# Patient Record
Sex: FEMALE | Race: WHITE | NOT HISPANIC OR LATINO | Employment: FULL TIME | URBAN - METROPOLITAN AREA
[De-identification: names, ages, dates, MRNs, and addresses within clinical notes are randomized per-mention and may not be internally consistent; named-entity substitution may affect disease eponyms.]

---

## 2017-08-02 ENCOUNTER — OFFICE VISIT - RIVER FALLS (OUTPATIENT)
Dept: FAMILY MEDICINE | Facility: CLINIC | Age: 52
End: 2017-08-02

## 2017-08-02 ASSESSMENT — MIFFLIN-ST. JEOR: SCORE: 1313.8

## 2017-08-08 ENCOUNTER — TRANSFERRED RECORDS (OUTPATIENT)
Dept: HEALTH INFORMATION MANAGEMENT | Facility: CLINIC | Age: 52
End: 2017-08-08

## 2017-08-08 LAB — HPV ABSTRACT: NORMAL

## 2021-01-26 ENCOUNTER — AMBULATORY - HEALTHEAST (OUTPATIENT)
Dept: NURSING | Facility: CLINIC | Age: 56
End: 2021-01-26

## 2021-02-16 ENCOUNTER — AMBULATORY - HEALTHEAST (OUTPATIENT)
Dept: NURSING | Facility: CLINIC | Age: 56
End: 2021-02-16

## 2022-01-08 ENCOUNTER — APPOINTMENT (OUTPATIENT)
Dept: RADIOLOGY | Facility: CLINIC | Age: 57
End: 2022-01-08
Attending: EMERGENCY MEDICINE
Payer: COMMERCIAL

## 2022-01-08 ENCOUNTER — HOSPITAL ENCOUNTER (EMERGENCY)
Facility: CLINIC | Age: 57
Discharge: HOME | End: 2022-01-08
Attending: EMERGENCY MEDICINE | Admitting: EMERGENCY MEDICINE
Payer: COMMERCIAL

## 2022-01-08 VITALS
RESPIRATION RATE: 18 BRPM | DIASTOLIC BLOOD PRESSURE: 72 MMHG | TEMPERATURE: 98.3 F | WEIGHT: 180 LBS | SYSTOLIC BLOOD PRESSURE: 146 MMHG | OXYGEN SATURATION: 96 % | HEART RATE: 72 BPM

## 2022-01-08 DIAGNOSIS — R07.89 ATYPICAL CHEST PAIN: ICD-10-CM

## 2022-01-08 LAB
ANION GAP SERPL CALCULATED.3IONS-SCNC: 7 MMOL/L (ref 5–18)
ATRIAL RATE - MUSE: 84 BPM
BASOPHILS # BLD AUTO: 0.1 10E3/UL (ref 0–0.2)
BASOPHILS NFR BLD AUTO: 1 %
BUN SERPL-MCNC: 14 MG/DL (ref 8–22)
CALCIUM SERPL-MCNC: 9.5 MG/DL (ref 8.5–10.5)
CHLORIDE BLD-SCNC: 106 MMOL/L (ref 98–107)
CO2 SERPL-SCNC: 27 MMOL/L (ref 22–31)
CREAT SERPL-MCNC: 0.7 MG/DL (ref 0.6–1.1)
D DIMER PPP FEU-MCNC: 0.38 UG/ML FEU (ref 0–0.5)
DIASTOLIC BLOOD PRESSURE - MUSE: NORMAL MMHG
EOSINOPHIL # BLD AUTO: 0.1 10E3/UL (ref 0–0.7)
EOSINOPHIL NFR BLD AUTO: 2 %
ERYTHROCYTE [DISTWIDTH] IN BLOOD BY AUTOMATED COUNT: 12.2 % (ref 10–15)
FLUAV RNA SPEC QL NAA+PROBE: NEGATIVE
FLUBV RNA RESP QL NAA+PROBE: NEGATIVE
GFR SERPL CREATININE-BSD FRML MDRD: >90 ML/MIN/1.73M2
GLUCOSE BLD-MCNC: 90 MG/DL (ref 70–125)
HCT VFR BLD AUTO: 43.6 % (ref 35–47)
HGB BLD-MCNC: 14.9 G/DL (ref 11.7–15.7)
IMM GRANULOCYTES # BLD: 0 10E3/UL
IMM GRANULOCYTES NFR BLD: 0 %
INTERPRETATION ECG - MUSE: NORMAL
LYMPHOCYTES # BLD AUTO: 2.1 10E3/UL (ref 0.8–5.3)
LYMPHOCYTES NFR BLD AUTO: 31 %
MCH RBC QN AUTO: 29.3 PG (ref 26.5–33)
MCHC RBC AUTO-ENTMCNC: 34.2 G/DL (ref 31.5–36.5)
MCV RBC AUTO: 86 FL (ref 78–100)
MONOCYTES # BLD AUTO: 0.5 10E3/UL (ref 0–1.3)
MONOCYTES NFR BLD AUTO: 8 %
NEUTROPHILS # BLD AUTO: 3.9 10E3/UL (ref 1.6–8.3)
NEUTROPHILS NFR BLD AUTO: 58 %
NRBC # BLD AUTO: 0 10E3/UL
NRBC BLD AUTO-RTO: 0 /100
P AXIS - MUSE: 68 DEGREES
PLATELET # BLD AUTO: 299 10E3/UL (ref 150–450)
POTASSIUM BLD-SCNC: 4.1 MMOL/L (ref 3.5–5)
PR INTERVAL - MUSE: 126 MS
QRS DURATION - MUSE: 88 MS
QT - MUSE: 386 MS
QTC - MUSE: 456 MS
R AXIS - MUSE: 83 DEGREES
RBC # BLD AUTO: 5.09 10E6/UL (ref 3.8–5.2)
SARS-COV-2 RNA RESP QL NAA+PROBE: NEGATIVE
SODIUM SERPL-SCNC: 140 MMOL/L (ref 136–145)
SYSTOLIC BLOOD PRESSURE - MUSE: NORMAL MMHG
T AXIS - MUSE: 46 DEGREES
TROPONIN I SERPL-MCNC: <0.01 NG/ML (ref 0–0.29)
VENTRICULAR RATE- MUSE: 84 BPM
WBC # BLD AUTO: 6.6 10E3/UL (ref 4–11)

## 2022-01-08 PROCEDURE — 84484 ASSAY OF TROPONIN QUANT: CPT | Performed by: EMERGENCY MEDICINE

## 2022-01-08 PROCEDURE — 80048 BASIC METABOLIC PNL TOTAL CA: CPT | Performed by: EMERGENCY MEDICINE

## 2022-01-08 PROCEDURE — 85025 COMPLETE CBC W/AUTO DIFF WBC: CPT | Performed by: EMERGENCY MEDICINE

## 2022-01-08 PROCEDURE — 36415 COLL VENOUS BLD VENIPUNCTURE: CPT | Performed by: EMERGENCY MEDICINE

## 2022-01-08 PROCEDURE — 250N000013 HC RX MED GY IP 250 OP 250 PS 637: Performed by: EMERGENCY MEDICINE

## 2022-01-08 PROCEDURE — C9803 HOPD COVID-19 SPEC COLLECT: HCPCS

## 2022-01-08 PROCEDURE — 71046 X-RAY EXAM CHEST 2 VIEWS: CPT

## 2022-01-08 PROCEDURE — 99285 EMERGENCY DEPT VISIT HI MDM: CPT | Mod: 25

## 2022-01-08 PROCEDURE — 93005 ELECTROCARDIOGRAM TRACING: CPT | Performed by: EMERGENCY MEDICINE

## 2022-01-08 PROCEDURE — 85379 FIBRIN DEGRADATION QUANT: CPT | Performed by: EMERGENCY MEDICINE

## 2022-01-08 PROCEDURE — 87636 SARSCOV2 & INF A&B AMP PRB: CPT | Performed by: EMERGENCY MEDICINE

## 2022-01-08 RX ORDER — ASPIRIN 81 MG/1
81 TABLET, CHEWABLE ORAL ONCE
Status: COMPLETED | OUTPATIENT
Start: 2022-01-08 | End: 2022-01-08

## 2022-01-08 RX ADMIN — ASPIRIN 81 MG CHEWABLE TABLET 81 MG: 81 TABLET CHEWABLE at 11:20

## 2022-01-08 NOTE — ED PROVIDER NOTES
EMERGENCY DEPARTMENT ENCOUNTER      NAME: Nisa Daniel  AGE: 56 year old female  YOB: 1965  MRN: [id number]  EVALUATION DATE & TIME: No admission date for patient encounter.    PCP: No primary care provider on file.    ED PROVIDER: Yannick Patterson M.D.      Chief Complaint   Patient presents with     Chest Pain         FINAL IMPRESSION:  1. Atypical chest pain          ED COURSE & MEDICAL DECISION MAKING:    Pertinent Labs & Imaging studies reviewed. (See chart for details)  56 year old female presents to the Emergency Department for evaluation of chest pain. Patient appears non toxic with stable vitals signs, patient is afebrile with no tachycardia or hypoxia, no increased work of breathing. Overall exam is benign.  Lungs are clear and abdomen is benign.  Patient denies any fevers or cough, pleurisy, hemoptysis, falls or trauma.  Considered ACS, considered PE though her story is atypical, nothing to suggest dissection, esophageal rupture, lungs are clear and again with no fever or cough to really suggest Covid though she does endorse some shortness of breath, nothing to suggest CHF, COPD, asthma or other restrictive lung process.  We will obtain screening labs, troponin, D-dimer, ECG and imaging studies based on the D-dimer result.  Patient was given aspirin, offered but she deferred other pain medications.    Reassessment: D-dimer was negative and so we did obtain chest x-ray.  Troponin negative and ECG nonischemic, given duration of symptoms, otherwise low risk factors and benign exam certainly nothing to suggest cardiac ischemia.  Chest x-ray reported no acute concerning findings and Covid and influenza screen was negative.  Repeat exam is benign the patient continued appear quite well and comfortable.  With negative work-up, benign exam and well appearance feel she is safe for discharge and close follow-up.  Will recommend conservative management with Tylenol or ibuprofen.  Given atypical  "story and negative work-up feel that she can first follow-up with primary care and I will recommend that she first follows up with her primary care provider or our [facility] in the next 5 to 7 days for continued outpatient management evaluation.  Discussed all these findings recommendations with the patient felt reassured and comfortable discharge.  Return precautions provided.    At the conclusion of the encounter I discussed the results of all of the tests and the disposition. The questions were answered and return precautions provided. The patient or family acknowledged understanding and was agreeable with the care plan.     10:32 AM I met with the patient, obtained history, performed an initial exam, and discussed options and plan for diagnostics and treatment here in the ED.  10:38 AM Reviewed ECG.   12:33 PM repeat exam is benign.  Discussed findings and discharge close follow-up.      MEDICATIONS GIVEN IN THE EMERGENCY:  Medications   aspirin (ASA) chewable tablet 81 mg (81 mg Oral Given 1/8/22 1120)       NEW PRESCRIPTIONS STARTED AT TODAY'S ER VISIT  There are no discharge medications for this patient.           =================================================================    HPI    Patient information was obtained from: Patient    Use of Intrepreter: N/A         Nisa Daniel is a 56 year old female with an unremarkable medical history who presents via private auto for evaluation of chest pain.    Patient reports sudden onset of substernal chest pain at 0430 this morning. Patient describes the pain as a squeezing/pressure that is constant. She denies any clear alleviating or aggravating factors. She notes associated symptoms of mild shortness of breath and nausea without vomit. Her shortness of breath is \"not significant\" and she did take omeprazole today with no relief of symptoms. She reports that she has been having intermittent left subclavicular pain for the past 2 weeks " (since ~12/25/21). She denies any recent increase in activity, recent surgeries, or immobilizations, that could have provoked symptoms.     Patient does reports an extensive family history of cardiac and neuro problems. On her maternal side there is a history of stroke and on her paternal side there is a cardiac history. Patient expresses concern with today's symptoms given this history. Patient herself denies any heart issues, hypertension, high cholesterol, diabetes, or any other pertinent medical problems. She denies any allergies to medications and is vaccinated against COVID x3. Patient denies fever, cough, urinary symptoms, changes in bowel movements, rashes, chills, hemoptysis, or any other complaints at this time.     Shx: Patient denies tobacco, drug, and alcohol use.     REVIEW OF SYSTEMS   Constitutional:  Denies fever, chills  Respiratory:  Denies productive cough or increased work of breathing  Cardiovascular:  Denies chest pain, palpitations  GI:  Denies abdominal pain, nausea, vomiting, or change in bowel or bladder habits   Musculoskeletal:  Denies any new muscle/joint swelling  Skin:  Denies rash   Neurologic:  Denies focal weakness  All systems negative except as marked.     PAST MEDICAL HISTORY:  History reviewed. No pertinent past medical history.    PAST SURGICAL HISTORY:  History reviewed. No pertinent surgical history.      CURRENT MEDICATIONS:    Prior to Admission medications    Not on File        ALLERGIES:  No Known Allergies    FAMILY HISTORY:  History reviewed. No pertinent family history.    SOCIAL HISTORY:   Social History     Socioeconomic History     Marital status: Not on file     Spouse name: Not on file     Number of children: Not on file     Years of education: Not on file     Highest education level: Not on file   Occupational History     Not on file   Tobacco Use     Smoking status: Not on file     Smokeless tobacco: No   Substance and Sexual Activity     Alcohol use: No      Drug use: No     Sexual activity: Not on file   Other Topics Concern     Not on file   Social History Narrative     Not on file     Social Determinants of Health     Financial Resource Strain: Not on file   Food Insecurity: Not on file   Transportation Needs: Not on file   Physical Activity: Not on file   Stress: Not on file   Social Connections: Not on file   Intimate Partner Violence: Not on file   Housing Stability: Not on file       VITALS:  Patient Vitals for the past 24 hrs:   BP Temp Temp src Pulse Resp SpO2 Weight   01/08/22 1300 (!) 146/72 -- -- 72 18 96 % --   01/08/22 1245 125/72 -- -- 72 26 96 % --   01/08/22 1230 115/70 -- -- 73 18 98 % --   01/08/22 1215 134/85 -- -- 69 23 97 % --   01/08/22 1200 124/80 -- -- 74 21 96 % --   01/08/22 1145 138/76 -- -- 69 23 96 % --   01/08/22 1130 (!) 156/83 -- -- 80 27 95 % --   01/08/22 1115 (!) 162/77 -- -- 77 23 96 % --   01/08/22 1100 (!) 140/84 -- -- 82 27 97 % --   01/08/22 1028 (!) 167/103 98.3  F (36.8  C) Oral 89 16 97 % 81.6 kg (180 lb)        PHYSICAL EXAM    Constitutional:  Awake, alert, in no apparent distress  HENT:  Normocephalic, Atraumatic. Bilateral external ears normal. Oropharynx moist. Nose normal. Neck- Normal range of motion with no guarding, No midline cervical tenderness, Supple, No stridor.   Eyes:  PERRL, EOMI with no signs of entrapment, Conjunctiva normal, No discharge.   Respiratory:  Normal breath sounds, No respiratory distress, No wheezing.    Cardiovascular:  Normal heart rate, Normal rhythm, No appreciable rubs or gallops.   GI:  Soft, No tenderness, No distension, No palpable masses  Musculoskeletal:  Intact distal pulses, No edema. Good range of motion in all major joints. No tenderness to palpation or major deformities noted.  Integument:  Warm, Dry, No erythema, No rash.   Neurologic:  Alert & oriented, Normal motor function, Normal sensory function, No focal deficits noted.   Psychiatric:  Affect normal, Judgment normal, Mood  normal.     LAB:  All pertinent labs reviewed and interpreted.  Results for orders placed or performed during the hospital encounter of 01/08/22   XR Chest 2 Views    Impression    IMPRESSION: No pleural fluid or pneumothorax. No airspace disease or edema. Normal size of the heart.   D dimer quantitative   Result Value Ref Range    D-Dimer Quantitative 0.38 0.00 - 0.50 ug/mL FEU   Basic metabolic panel   Result Value Ref Range    Sodium 140 136 - 145 mmol/L    Potassium 4.1 3.5 - 5.0 mmol/L    Chloride 106 98 - 107 mmol/L    Carbon Dioxide (CO2) 27 22 - 31 mmol/L    Anion Gap 7 5 - 18 mmol/L    Urea Nitrogen 14 8 - 22 mg/dL    Creatinine 0.70 0.60 - 1.10 mg/dL    Calcium 9.5 8.5 - 10.5 mg/dL    Glucose 90 70 - 125 mg/dL    GFR Estimate >90 >60 mL/min/1.73m2   Result Value Ref Range    Troponin I <0.01 0.00 - 0.29 ng/mL   Symptomatic; Unknown Influenza A/B & SARS-CoV2 (COVID-19) Virus PCR Multiplex Nasopharyngeal    Specimen: Nasopharyngeal; Swab   Result Value Ref Range    Influenza A PCR Negative Negative    Influenza B PCR Negative Negative    SARS CoV2 PCR Negative Negative   CBC with platelets and differential   Result Value Ref Range    WBC Count 6.6 4.0 - 11.0 10e3/uL    RBC Count 5.09 3.80 - 5.20 10e6/uL    Hemoglobin 14.9 11.7 - 15.7 g/dL    Hematocrit 43.6 35.0 - 47.0 %    MCV 86 78 - 100 fL    MCH 29.3 26.5 - 33.0 pg    MCHC 34.2 31.5 - 36.5 g/dL    RDW 12.2 10.0 - 15.0 %    Platelet Count 299 150 - 450 10e3/uL    % Neutrophils 58 %    % Lymphocytes 31 %    % Monocytes 8 %    % Eosinophils 2 %    % Basophils 1 %    % Immature Granulocytes 0 %    NRBCs per 100 WBC 0 <1 /100    Absolute Neutrophils 3.9 1.6 - 8.3 10e3/uL    Absolute Lymphocytes 2.1 0.8 - 5.3 10e3/uL    Absolute Monocytes 0.5 0.0 - 1.3 10e3/uL    Absolute Eosinophils 0.1 0.0 - 0.7 10e3/uL    Absolute Basophils 0.1 0.0 - 0.2 10e3/uL    Absolute Immature Granulocytes 0.0 <=0.4 10e3/uL    Absolute NRBCs 0.0 10e3/uL       RADIOLOGY:  XR Chest 2  Views   Final Result   IMPRESSION: No pleural fluid or pneumothorax. No airspace disease or edema. Normal size of the heart.             EKG:    Sinus rhythm with sinus arrhythmia, no specific ST acute ischemic changes, no concerning dysrhythmias or interval prolongation, no priors for comparison    I have independently reviewed and interpreted the EKG(s) documented above.    PROCEDURES:   None.       I, Vidhya Martini, am serving as a scribe to document services personally performed by Yannick Patterson MD, based on my observation and the provider's statements to me. I, Yannick Patterson MD attest that Vidhya Martini is acting in a scribe capacity, has observed my performance of the services and has documented them in accordance with my direction.    Yannick Patterson M.D.  Emergency Medicine  [facility] EMERGENCY ROOM  [address] [facility] [address] [id number] [phone]  Dept: [phone]       Yannick Patterson MD  01/08/22 4722

## 2022-01-08 NOTE — ED TRIAGE NOTES
Patient is here with constant mid chest pain that at 0430 this morning. Nothing makes this better or worse. She is complaining of slight nausea with shortness of breath. She did have intermittent sub clavicle pain prior for two weeks. 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 Strong cardiac and stroke hx in her family.

## 2022-01-08 NOTE — DISCHARGE INSTRUCTIONS
You may take Tylenol or ibuprofen as needed for pain and discomfort, fevers.  Follow-up with your primary care provider or our primary care referral in the next 5 to 7 days for continued outpatient management evaluation.  Please return for any worsening or more concerning symptoms.

## 2022-01-11 ENCOUNTER — OFFICE VISIT (OUTPATIENT)
Dept: FAMILY MEDICINE | Facility: CLINIC | Age: 57
End: 2022-01-11
Payer: COMMERCIAL

## 2022-01-11 VITALS
BODY MASS INDEX: 29.82 KG/M2 | HEART RATE: 81 BPM | OXYGEN SATURATION: 98 % | HEIGHT: 65 IN | WEIGHT: 179 LBS | DIASTOLIC BLOOD PRESSURE: 78 MMHG | SYSTOLIC BLOOD PRESSURE: 132 MMHG

## 2022-01-11 DIAGNOSIS — Z12.11 SPECIAL SCREENING FOR MALIGNANT NEOPLASMS, COLON: Primary | ICD-10-CM

## 2022-01-11 DIAGNOSIS — D68.2 FACTOR V DEFICIENCY (H): ICD-10-CM

## 2022-01-11 DIAGNOSIS — E66.3 OVERWEIGHT WITH BODY MASS INDEX (BMI) OF 29 TO 29.9 IN ADULT: ICD-10-CM

## 2022-01-11 DIAGNOSIS — R07.89 ATYPICAL CHEST PAIN: ICD-10-CM

## 2022-01-11 DIAGNOSIS — Z00.00 ENCOUNTER FOR PREVENTIVE CARE: ICD-10-CM

## 2022-01-11 DIAGNOSIS — Z13.820 SCREENING FOR OSTEOPOROSIS: ICD-10-CM

## 2022-01-11 DIAGNOSIS — Z12.31 ENCOUNTER FOR SCREENING MAMMOGRAM FOR BREAST CANCER: ICD-10-CM

## 2022-01-11 DIAGNOSIS — Z82.49 FAMILY HISTORY OF CORONARY ARTERIOSCLEROSIS: ICD-10-CM

## 2022-01-11 PROCEDURE — 99204 OFFICE O/P NEW MOD 45 MIN: CPT | Performed by: FAMILY MEDICINE

## 2022-01-11 ASSESSMENT — MIFFLIN-ST. JEOR: SCORE: 1407.82

## 2022-01-11 NOTE — ASSESSMENT & PLAN NOTE
Covid vaccine - done and boosted.   Flu shot done.   Pap: need pap anytime now.   Mammo: ordered.   Colonoscopy: normal 10 years ago and due for follow up.    Std testing desired:  offered  Osteoporosis prevention discussed.  vitamin d levels ordered. Recommend daily calcium and vitamin d intake to keep good bone health. Recommend weight bearing exercise, no tobacco, and limit alcohol  dexa - normal.   Recommend sunscreen, exercise, & healthy diet.  Offered cbc, cmp, lipids and asked what other testing she  desires today  I have had an Advance Directives discussion with the patient. She says she has this, and she will bring it.   Body mass index is 29.79 kg/m .   mychart active.

## 2022-01-11 NOTE — ASSESSMENT & PLAN NOTE
She has done a coronary calcium score 7-8 years ago and it was basically normal.   At this time we will undertake lipid panel, and repeat coronary calcium score and cardiology consult.

## 2022-01-11 NOTE — PROGRESS NOTES
Assessment & Plan   Problem List Items Addressed This Visit        Hematologic    Factor V deficiency (H)    Relevant Orders    Adult Cardiology Eval Referral    CT Coronary Calcium Scan       Other    Family history of coronary arteriosclerosis     She has done a coronary calcium score 7-8 years ago and it was basically normal.   At this time we will undertake lipid panel, and repeat coronary calcium score and cardiology consult.          Relevant Orders    Adult Cardiology Eval Referral    CT Coronary Calcium Scan    Encounter for preventive care     Covid vaccine - done and boosted.   Flu shot done.   Pap: need pap anytime now.   Mammo: ordered.   Colonoscopy: normal 10 years ago and due for follow up.    Std testing desired:  offered  Osteoporosis prevention discussed.  vitamin d levels ordered. Recommend daily calcium and vitamin d intake to keep good bone health. Recommend weight bearing exercise, no tobacco, and limit alcohol  dexa - normal.   Recommend sunscreen, exercise, & healthy diet.  Offered cbc, cmp, lipids and asked what other testing she  desires today  I have had an Advance Directives discussion with the patient. She says she has this, and she will bring it.   Body mass index is 29.79 kg/m .   simi active.          Overweight with body mass index (BMI) of 29 to 29.9 in adult     Weight loss program offered, she wants to try to lose weight on her own first, starting to go back to working out.           Other Visit Diagnoses     Special screening for malignant neoplasms, colon    -  Primary    Relevant Orders    Adult Gastro Ref - Procedure Only    Atypical chest pain        Relevant Orders    Adult Cardiology Eval Referral    CT Coronary Calcium Scan    Encounter for screening mammogram for breast cancer        Relevant Orders    *MA Screening Digital Bilateral    Screening for osteoporosis        Relevant Orders    DX Hip/Pelvis/Spine                    BMI:   Estimated body mass index is  "29.79 kg/m  as calculated from the following:    Height as of this encounter: 1.651 m (5' 5\").    Weight as of this encounter: 81.2 kg (179 lb).   Weight management plan: Discussed healthy diet and exercise guidelines    Patient Instructions   Return in about 4 weeks (around 2/8/2022) for Routine preventive/ pap and results.       Return in about 4 weeks (around 2/8/2022) for Routine preventive/ pap and results.    Mirella Haile MD  [facility]    Cbc and bmp were normal in er 1/8/2022.       Chief Complaint   Patient presents with     Emergency Room Visit Notes        Jany Hickey is a 57 year old who presents for the following health issues         Objective    /78 (BP Location: Left arm, Patient Position: Left side, Cuff Size: Adult Large)   Pulse 81   Ht 1.651 m (5' 5\")   Wt 81.2 kg (179 lb)   SpO2 98%   BMI 29.79 kg/m    Body mass index is 29.79 kg/m .  Physical Exam  Constitutional:       Appearance: Normal appearance.   HENT:      Head: Normocephalic and atraumatic.   Cardiovascular:      Rate and Rhythm: Normal rate and regular rhythm.   Pulmonary:      Effort: Pulmonary effort is normal.   Musculoskeletal:         General: Normal range of motion.      Cervical back: Normal range of motion and neck supple.   Neurological:      General: No focal deficit present.      Mental Status: She is alert and oriented to person, place, and time.         "

## 2022-01-11 NOTE — ASSESSMENT & PLAN NOTE
Weight loss program offered, she wants to try to lose weight on her own first, starting to go back to working out.

## 2022-01-12 ENCOUNTER — DOCUMENTATION ONLY (OUTPATIENT)
Dept: LAB | Facility: CLINIC | Age: 57
End: 2022-01-12
Payer: COMMERCIAL

## 2022-01-12 DIAGNOSIS — Z13.220 LIPID SCREENING: ICD-10-CM

## 2022-01-12 DIAGNOSIS — Z11.4 SCREENING FOR HUMAN IMMUNODEFICIENCY VIRUS WITHOUT PRESENCE OF RISK FACTORS: ICD-10-CM

## 2022-01-12 DIAGNOSIS — Z11.59 ENCOUNTER FOR HEPATITIS C SCREENING TEST FOR LOW RISK PATIENT: Primary | ICD-10-CM

## 2022-01-12 NOTE — PROGRESS NOTES
Nisa Dawndanielle Daniel has an upcoming lab appointment:    Future Appointments   Date Time Provider Department Center   1/14/2022  7:30 AM STWT LAB SWLABR [facility]   2/11/2022  3:20 PM Mirella Haile MD SWFERNANDA [facility]     Patient is scheduled for the following lab(s): fasting labs    There is no order available. Please review and place either future orders or HMPO (Review of Health Maintenance Protocol Orders), as appropriate.    Health Maintenance Due   Topic     ANNUAL REVIEW OF HM ORDERS      HIV SCREENING      HEPATITIS C SCREENING      LIPID      Cherise Lang

## 2022-01-14 ENCOUNTER — LAB (OUTPATIENT)
Dept: LAB | Facility: CLINIC | Age: 57
End: 2022-01-14
Payer: COMMERCIAL

## 2022-01-14 DIAGNOSIS — Z11.4 SCREENING FOR HUMAN IMMUNODEFICIENCY VIRUS WITHOUT PRESENCE OF RISK FACTORS: ICD-10-CM

## 2022-01-14 DIAGNOSIS — Z11.59 ENCOUNTER FOR HEPATITIS C SCREENING TEST FOR LOW RISK PATIENT: ICD-10-CM

## 2022-01-14 DIAGNOSIS — Z13.220 LIPID SCREENING: ICD-10-CM

## 2022-01-14 LAB
CHOLEST SERPL-MCNC: 224 MG/DL
FASTING STATUS PATIENT QL REPORTED: YES
HDLC SERPL-MCNC: 49 MG/DL
HIV 1+2 AB+HIV1 P24 AG SERPL QL IA: NEGATIVE
LDLC SERPL CALC-MCNC: 150 MG/DL
TRIGL SERPL-MCNC: 127 MG/DL

## 2022-01-14 PROCEDURE — 87389 HIV-1 AG W/HIV-1&-2 AB AG IA: CPT

## 2022-01-14 PROCEDURE — 36415 COLL VENOUS BLD VENIPUNCTURE: CPT

## 2022-01-14 PROCEDURE — 86803 HEPATITIS C AB TEST: CPT

## 2022-01-14 PROCEDURE — 80061 LIPID PANEL: CPT

## 2022-01-17 ENCOUNTER — OFFICE VISIT (OUTPATIENT)
Dept: CARDIOLOGY | Facility: CLINIC | Age: 57
End: 2022-01-17
Payer: COMMERCIAL

## 2022-01-17 VITALS
HEART RATE: 75 BPM | WEIGHT: 176 LBS | SYSTOLIC BLOOD PRESSURE: 128 MMHG | DIASTOLIC BLOOD PRESSURE: 68 MMHG | OXYGEN SATURATION: 97 % | RESPIRATION RATE: 16 BRPM | BODY MASS INDEX: 29.29 KG/M2

## 2022-01-17 DIAGNOSIS — D68.2 FACTOR V DEFICIENCY (H): ICD-10-CM

## 2022-01-17 DIAGNOSIS — Z82.49 FAMILY HISTORY OF CORONARY ARTERIOSCLEROSIS: ICD-10-CM

## 2022-01-17 DIAGNOSIS — R07.89 ATYPICAL CHEST PAIN: ICD-10-CM

## 2022-01-17 LAB — HCV AB SERPL QL IA: NEGATIVE

## 2022-01-17 PROCEDURE — 99204 OFFICE O/P NEW MOD 45 MIN: CPT | Performed by: INTERNAL MEDICINE

## 2022-01-17 NOTE — PROGRESS NOTES
Thank you, Mirella Wooten, for asking the [facility] team to see Ms. Nisa Daniel to evaluate Chest Pain        Assessment/Recommendations   Assessment:    1. Atypical chest pain - does not have many personal risk factors for CAD except for hyperlipidemia, but she has a strong family history of CAD.  2. Hyperlipidemia - will hold off on medication until/unless significant CAD is identified.    Plan:  1. CT coronary angiogram to evaluate for CAD  2. If moderate or greater CAD identified would need to start statin  3. Follow up pending abnormal results.         History of Present Illness   Ms. Nisa Daniel is a 57 year old female with no significant past history who presents for evaluation of chest pain.    Ms. Daniel awakened with chest pain in the early morning hours of 1/8/22.  The pain was described as a squeezing or pressure sensation that was constant.  The discomfort was associated with mild shortness of breath with nausea but no emesis.  She was unable to identify any significant aggravating or alleviating factors.  She presented to the emergency department after the pain persisted for several hours without relenting.  Her ER evaluation was fairly unremarkable with negative cardiac enzymes and no significant other lab abnormalities.  EKG was nonischemic. Her pain continues to be present more often than not. It remains unrelated to exertion. She does have frequent dizziness that has been present since she had COVID-19 in August of 2020.    Other than noted above, Ms. Daniel denies any chest pain/pressure/tightness, shortness of breath at rest or with exertion, light headedness/dizziness, pre-syncope, syncope, lower extremity swelling, palpitations, paroxysmal nocturnal dyspnea (PND), or orthopnea.     Cardiac Problems and Cardiac Diagnostics     Most Recent Cardiac testing:  ECG dated 1/11/22 (personaly reviewed and interpreted): sinus rhythm with sinus arrhythmia. No  ischemic changes.         Medications  Allergies   Current Outpatient Medications   Medication Sig Dispense Refill     Multiple Vitamin (MULTI VITAMIN PO)         No Known Allergies     Physical Examination Review of Systems   Vitals: /68 (BP Location: Left arm, Patient Position: Sitting, Cuff Size: Adult Regular)   Pulse 75   Resp 16   Wt 79.8 kg (176 lb)   SpO2 97%   BMI 29.29 kg/m    BMI= Body mass index is 29.29 kg/m .  Wt Readings from Last 3 Encounters:   01/17/22 79.8 kg (176 lb)   01/11/22 81.2 kg (179 lb)   01/08/22 81.6 kg (180 lb)       General Appearance:   Pleasant female, appears stated age. no acute distress, mildly overweight body habitus   ENT/Mouth: membranes moist, no apparent gingival bleeding.    EYES:  no scleral icterus, normal conjunctivae   Neck: no carotid bruits. supple   Respiratory:   lungs are clear to auscultation, no rales or wheezing, equal chest wall expansion    Cardiovascular:   Regular rhythm, normal rate. Normal first and second heart sounds with no murmurs, rubs, or gallops; the carotid, radial and posterior tibial pulses are intact, Jugular venous pressure normal, no edema bilaterally    Abdomen/GI:  Soft, non-tender   Extremities: no cyanosis or clubbing   Skin: no xanthelasma, warm.    Heme/lymph/ Immunology No apparent bleeding noted.   Neurologic: Alert and oriented. normal gait, no tremors   Psychiatric: Pleasant, calm, appropriate affect.         Please refer above for cardiac ROS details.       Past History   Past Medical History:   Past Medical History:   Diagnosis Date     Hypertension 1.8.2022    165/107 in ER, no prior hx        Past Surgical History:   Past Surgical History:   Procedure Laterality Date     ABDOMEN SURGERY  1990's- 2001    mulitple fertility related procedures, LAVH     BIOPSY      breast bx, fibroid     COLONOSCOPY      normal     GYN SURGERY  1990's    many fertility related procedures     HYSTERECTOMY, PAP STILL INDICATED        ORTHOPEDIC SURGERY  2005    ORIF right ankle        Family History:   Family History   Problem Relation Age of Onset     Hypertension Mother      Hyperlipidemia Mother      Cerebrovascular Disease Mother         x 2, ages 69 & 74     Other Cancer Mother         Adenocystic carcinoma cheek     Coronary Artery Disease Father         CABG x 3, high LPa     Hypertension Father      Hyperlipidemia Father      Other Cancer Father         Multiple myeloma, NOn-hodgkins Lyphoma     Hypertension Sister         HTN, normal LPa     Hyperlipidemia Sister      Factor V Leiden deficiency Sister         no stroke, + factor 5 Leiden     Hypertension Brother         HTN, elevated LPa     Hyperlipidemia Brother      Factor V Leiden deficiency Brother         no stroke, DVT at age 47, + factor 5 leiden     Deep Vein Thrombosis (DVT) Brother      Cerebrovascular Disease Maternal Grandmother         massive stroke at age 69     Other Cancer Paternal Grandmother         ? Mutilple Myeloma ( of Bone cancer in      Other Cancer Paternal Grandfather         stomach cancer     Coronary Artery Disease Cousin         elevated LPa, HTN, x 2 with CABG     Cerebrovascular Disease Cousin         no stroke, DVT at age 33, + factor 5 Leiden     Coronary Artery Disease Other         CABG, elevated LPa      Social History:   Social History     Socioeconomic History     Marital status:      Spouse name: Not on file     Number of children: Not on file     Years of education: Not on file     Highest education level: Not on file   Occupational History     Not on file   Tobacco Use     Smoking status: Never Smoker     Smokeless tobacco: Never Used   Substance and Sexual Activity     Alcohol use: Yes     Comment: a glass of wine 2-3 times a year, at most     Drug use: Never     Sexual activity: Yes     Partners: Male     Birth control/protection: Female Surgical     Comment: JEAN CLAUDE    Other Topics Concern     Parent/sibling w/ CABG, MI or  angioplasty before 65F 55M? No   Social History Narrative    1/11/2022 lives with . Her daughter is in college in [place] and is 21 and studying speech path. 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 Likes traveling, photography, hiking.      Social Determinants of Health     Financial Resource Strain: Not on file   Food Insecurity: Not on file   Transportation Needs: Not on file   Physical Activity: Not on file   Stress: Not on file   Social Connections: Not on file   Intimate Partner Violence: Not on file   Housing Stability: Not on file            Lab Results    Chemistry/lipid CBC Cardiac Enzymes/BNP/TSH/INR   Lab Results   Component Value Date    CHOL 224 (H) 01/14/2022    HDL 49 (L) 01/14/2022    TRIG 127 01/14/2022    BUN 14 01/08/2022     01/08/2022    CO2 27 01/08/2022    Lab Results   Component Value Date    WBC 6.6 01/08/2022    HGB 14.9 01/08/2022    HCT 43.6 01/08/2022    MCV 86 01/08/2022     01/08/2022    Lab Results   Component Value Date    TROPONINI <0.01 01/08/2022

## 2022-01-17 NOTE — PATIENT INSTRUCTIONS
It was a pleasure to meet with you today.      Below is a summary of your visit.   1. Schedule a CT coronary angiogram and cancel your calcium score  2. You can be active as you tolerate it  3. Follow up as needed    We will call you to inform you of your test or procedure results within 3 business days of the test being performed.  If you do not hear from our office with the test results within 1 week please do not hesitate to call asking for these results.    Please do not hesitate to call the [facility] clinic with any questions or concerns at [phone]. You can also reach my nurse, Shannan, during normal business hours at [phone].    Sincerely,

## 2022-01-17 NOTE — LETTER
1/17/2022    Mirella Haile MD  [address] [facility] [address]    RE: Nisa Daniel       Dear Colleague,     I had the pleasure of seeing Nisa Daniel in the [facility].      Thank you, Mirella Wooten, for asking the [facility] team to see Ms. Nisa Daniel to evaluate Chest Pain        Assessment/Recommendations   Assessment:    1. Atypical chest pain - does not have many personal risk factors for CAD except for hyperlipidemia, but she has a strong family history of CAD.  2. Hyperlipidemia - will hold off on medication until/unless significant CAD is identified.    Plan:  1. CT coronary angiogram to evaluate for CAD  2. If moderate or greater CAD identified would need to start statin  3. Follow up pending abnormal results.         History of Present Illness   Ms. Nisa Daniel is a 57 year old female with no significant past history who presents for evaluation of chest pain.    Ms. Daniel awakened with chest pain in the early morning hours of 1/8/22.  The pain was described as a squeezing or pressure sensation that was constant.  The discomfort was associated with mild shortness of breath with nausea but no emesis.  She was unable to identify any significant aggravating or alleviating factors.  She presented to the emergency department after the pain persisted for several hours without relenting.  Her ER evaluation was fairly unremarkable with negative cardiac enzymes and no significant other lab abnormalities.  EKG was nonischemic. Her pain continues to be present more often than not. It remains unrelated to exertion. She does have frequent dizziness that has been present since she had COVID-19 in August of 2020.    Other than noted above, Ms. Daniel denies any chest pain/pressure/tightness, shortness of breath at rest or with exertion, light headedness/dizziness, pre-syncope, syncope, lower extremity swelling, palpitations, paroxysmal  nocturnal dyspnea (PND), or orthopnea.     Cardiac Problems and Cardiac Diagnostics     Most Recent Cardiac testing:  ECG dated 1/11/22 (personaly reviewed and interpreted): sinus rhythm with sinus arrhythmia. No ischemic changes.         Medications  Allergies   Current Outpatient Medications   Medication Sig Dispense Refill     Multiple Vitamin (MULTI VITAMIN PO)         No Known Allergies     Physical Examination Review of Systems   Vitals: /68 (BP Location: Left arm, Patient Position: Sitting, Cuff Size: Adult Regular)   Pulse 75   Resp 16   Wt 79.8 kg (176 lb)   SpO2 97%   BMI 29.29 kg/m    BMI= Body mass index is 29.29 kg/m .  Wt Readings from Last 3 Encounters:   01/17/22 79.8 kg (176 lb)   01/11/22 81.2 kg (179 lb)   01/08/22 81.6 kg (180 lb)       General Appearance:   Pleasant female, appears stated age. no acute distress, mildly overweight body habitus   ENT/Mouth: membranes moist, no apparent gingival bleeding.    EYES:  no scleral icterus, normal conjunctivae   Neck: no carotid bruits. supple   Respiratory:   lungs are clear to auscultation, no rales or wheezing, equal chest wall expansion    Cardiovascular:   Regular rhythm, normal rate. Normal first and second heart sounds with no murmurs, rubs, or gallops; the carotid, radial and posterior tibial pulses are intact, Jugular venous pressure normal, no edema bilaterally    Abdomen/GI:  Soft, non-tender   Extremities: no cyanosis or clubbing   Skin: no xanthelasma, warm.    Heme/lymph/ Immunology No apparent bleeding noted.   Neurologic: Alert and oriented. normal gait, no tremors   Psychiatric: Pleasant, calm, appropriate affect.         Please refer above for cardiac ROS details.       Past History   Past Medical History:   Past Medical History:   Diagnosis Date     Hypertension 1.8.2022    165/107 in ER, no prior hx        Past Surgical History:   Past Surgical History:   Procedure Laterality Date     ABDOMEN SURGERY  1990's- 2001     mulitple fertility related procedures, LAVH     BIOPSY      breast bx, fibroid     COLONOSCOPY      normal     GYN SURGERY      many fertility related procedures     HYSTERECTOMY, PAP STILL INDICATED       ORTHOPEDIC SURGERY      ORIF right ankle        Family History:   Family History   Problem Relation Age of Onset     Hypertension Mother      Hyperlipidemia Mother      Cerebrovascular Disease Mother         x 2, ages 69 & 74     Other Cancer Mother         Adenocystic carcinoma cheek     Coronary Artery Disease Father         CABG x 3, high LPa     Hypertension Father      Hyperlipidemia Father      Other Cancer Father         Multiple myeloma, NOn-hodgkins Lyphoma     Hypertension Sister         HTN, normal LPa     Hyperlipidemia Sister      Factor V Leiden deficiency Sister         no stroke, + factor 5 Leiden     Hypertension Brother         HTN, elevated LPa     Hyperlipidemia Brother      Factor V Leiden deficiency Brother         no stroke, DVT at age 47, + factor 5 leiden     Deep Vein Thrombosis (DVT) Brother      Cerebrovascular Disease Maternal Grandmother         massive stroke at age 69     Other Cancer Paternal Grandmother         ? Mutilple Myeloma ( of Bone cancer in      Other Cancer Paternal Grandfather         stomach cancer     Coronary Artery Disease Cousin         elevated LPa, HTN, x 2 with CABG     Cerebrovascular Disease Cousin         no stroke, DVT at age 33, + factor 5 Leiden     Coronary Artery Disease Other         CABG, elevated LPa      Social History:   Social History     Socioeconomic History     Marital status:      Spouse name: Not on file     Number of children: Not on file     Years of education: Not on file     Highest education level: Not on file   Occupational History     Not on file   Tobacco Use     Smoking status: Never Smoker     Smokeless tobacco: Never Used   Substance and Sexual Activity     Alcohol use: Yes     Comment: a glass of wine 2-3  times a year, at most     Drug use: Never     Sexual activity: Yes     Partners: Male     Birth control/protection: Female Surgical     Comment: LAVH 2001   Other Topics Concern     Parent/sibling w/ CABG, MI or angioplasty before 65F 55M? No   Social History Narrative    1/11/2022 lives with . Her daughter is in college in [place] and is 21 and studying speech path. 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 Likes traveling, photography, hiking.      Social Determinants of Health     Financial Resource Strain: Not on file   Food Insecurity: Not on file   Transportation Needs: Not on file   Physical Activity: Not on file   Stress: Not on file   Social Connections: Not on file   Intimate Partner Violence: Not on file   Housing Stability: Not on file            Lab Results    Chemistry/lipid CBC Cardiac Enzymes/BNP/TSH/INR   Lab Results   Component Value Date    CHOL 224 (H) 01/14/2022    HDL 49 (L) 01/14/2022    TRIG 127 01/14/2022    BUN 14 01/08/2022     01/08/2022    CO2 27 01/08/2022    Lab Results   Component Value Date    WBC 6.6 01/08/2022    HGB 14.9 01/08/2022    HCT 43.6 01/08/2022    MCV 86 01/08/2022     01/08/2022    Lab Results   Component Value Date    TROPONINI <0.01 01/08/2022                Thank you for allowing me to participate in the care of your patient.      Sincerely,     Danny Wilcox MD     [facility]  cc:   Mirella Haile MD  [address] [place] [address]

## 2022-02-09 ENCOUNTER — HOSPITAL ENCOUNTER (OUTPATIENT)
Dept: CT IMAGING | Facility: CLINIC | Age: 57
Discharge: HOME | End: 2022-02-09
Attending: INTERNAL MEDICINE | Admitting: INTERNAL MEDICINE
Payer: COMMERCIAL

## 2022-02-09 VITALS
BODY MASS INDEX: 29.16 KG/M2 | DIASTOLIC BLOOD PRESSURE: 73 MMHG | HEIGHT: 65 IN | HEART RATE: 68 BPM | SYSTOLIC BLOOD PRESSURE: 115 MMHG | WEIGHT: 175 LBS

## 2022-02-09 DIAGNOSIS — R07.89 ATYPICAL CHEST PAIN: ICD-10-CM

## 2022-02-09 DIAGNOSIS — Z82.49 FAMILY HISTORY OF CORONARY ARTERIOSCLEROSIS: ICD-10-CM

## 2022-02-09 LAB
BSA FOR ECHO PROCEDURE: 0 M2
CV CALCIUM SCORE AGATSTON LM: 0
CV CALCIUM SCORING AGATSON LAD: 0
CV CALCIUM SCORING AGATSTON CX: 0
CV CALCIUM SCORING AGATSTON RCA: 0
CV CALCIUM SCORING AGATSTON TOTAL: 0

## 2022-02-09 PROCEDURE — 75574 CT ANGIO HRT W/3D IMAGE: CPT

## 2022-02-09 PROCEDURE — 75574 CT ANGIO HRT W/3D IMAGE: CPT | Mod: 26 | Performed by: INTERNAL MEDICINE

## 2022-02-09 PROCEDURE — 250N000011 HC RX IP 250 OP 636: Performed by: INTERNAL MEDICINE

## 2022-02-09 PROCEDURE — 250N000009 HC RX 250: Performed by: INTERNAL MEDICINE

## 2022-02-09 PROCEDURE — 250N000013 HC RX MED GY IP 250 OP 250 PS 637: Performed by: INTERNAL MEDICINE

## 2022-02-09 RX ORDER — LIDOCAINE 40 MG/G
CREAM TOPICAL
Status: DISCONTINUED | OUTPATIENT
Start: 2022-02-09 | End: 2022-02-10 | Stop reason: HOSPADM

## 2022-02-09 RX ORDER — NITROGLYCERIN 0.4 MG/1
0.4 TABLET SUBLINGUAL ONCE
Status: COMPLETED | OUTPATIENT
Start: 2022-02-09 | End: 2022-02-09

## 2022-02-09 RX ORDER — DILTIAZEM HYDROCHLORIDE 5 MG/ML
10 INJECTION INTRAVENOUS
Status: DISCONTINUED | OUTPATIENT
Start: 2022-02-09 | End: 2022-02-10 | Stop reason: HOSPADM

## 2022-02-09 RX ORDER — METOPROLOL TARTRATE 1 MG/ML
5-20 INJECTION, SOLUTION INTRAVENOUS
Status: DISCONTINUED | OUTPATIENT
Start: 2022-02-09 | End: 2022-02-10 | Stop reason: HOSPADM

## 2022-02-09 RX ORDER — DILTIAZEM HYDROCHLORIDE 5 MG/ML
5-25 INJECTION INTRAVENOUS
Status: DISCONTINUED | OUTPATIENT
Start: 2022-02-09 | End: 2022-02-10 | Stop reason: HOSPADM

## 2022-02-09 RX ORDER — IOPAMIDOL 755 MG/ML
100 INJECTION, SOLUTION INTRAVASCULAR ONCE
Status: COMPLETED | OUTPATIENT
Start: 2022-02-09 | End: 2022-02-09

## 2022-02-09 RX ADMIN — IOPAMIDOL 100 ML: 755 INJECTION, SOLUTION INTRAVENOUS at 07:08

## 2022-02-09 RX ADMIN — METOPROLOL TARTRATE 5 MG: 5 INJECTION, SOLUTION INTRAVENOUS at 07:06

## 2022-02-09 RX ADMIN — NITROGLYCERIN 0.4 MG: 0.4 TABLET SUBLINGUAL at 06:59

## 2022-02-09 ASSESSMENT — MIFFLIN-ST. JEOR: SCORE: 1389.67

## 2022-02-09 NOTE — PROGRESS NOTES
Coronary CTA with Calcium Score: CTA checklist completed.  Pt has had IV contarst before without incident.  Pt educated re: procedure.  Pt understands process. Pt not p[regnant or nursing.  Pt denies taking any phosphhidiesterace inhibitors. Instructed to drink 6-8 extra glasses of water today.

## 2022-02-10 ENCOUNTER — HOSPITAL ENCOUNTER (OUTPATIENT)
Dept: MAMMOGRAPHY | Facility: CLINIC | Age: 57
Discharge: HOME | End: 2022-02-10
Attending: FAMILY MEDICINE | Admitting: FAMILY MEDICINE
Payer: COMMERCIAL

## 2022-02-10 DIAGNOSIS — Z12.31 ENCOUNTER FOR SCREENING MAMMOGRAM FOR BREAST CANCER: ICD-10-CM

## 2022-02-10 PROCEDURE — 77067 SCR MAMMO BI INCL CAD: CPT

## 2022-02-11 ENCOUNTER — OFFICE VISIT (OUTPATIENT)
Dept: FAMILY MEDICINE | Facility: CLINIC | Age: 57
End: 2022-02-11
Payer: COMMERCIAL

## 2022-02-11 VITALS
TEMPERATURE: 97.7 F | DIASTOLIC BLOOD PRESSURE: 80 MMHG | SYSTOLIC BLOOD PRESSURE: 130 MMHG | HEART RATE: 85 BPM | OXYGEN SATURATION: 96 % | RESPIRATION RATE: 12 BRPM | WEIGHT: 177 LBS | BODY MASS INDEX: 29.49 KG/M2 | HEIGHT: 65 IN

## 2022-02-11 DIAGNOSIS — Z12.11 SPECIAL SCREENING FOR MALIGNANT NEOPLASMS, COLON: ICD-10-CM

## 2022-02-11 DIAGNOSIS — Z12.4 SCREENING FOR MALIGNANT NEOPLASM OF CERVIX: Primary | ICD-10-CM

## 2022-02-11 DIAGNOSIS — E66.3 OVERWEIGHT WITH BODY MASS INDEX (BMI) OF 29 TO 29.9 IN ADULT: ICD-10-CM

## 2022-02-11 DIAGNOSIS — Z82.49 FAMILY HISTORY OF CORONARY ARTERIOSCLEROSIS: ICD-10-CM

## 2022-02-11 DIAGNOSIS — Z13.820 SCREENING FOR OSTEOPOROSIS: ICD-10-CM

## 2022-02-11 DIAGNOSIS — Z00.00 ENCOUNTER FOR PREVENTIVE CARE: ICD-10-CM

## 2022-02-11 PROCEDURE — 99396 PREV VISIT EST AGE 40-64: CPT | Performed by: FAMILY MEDICINE

## 2022-02-11 PROCEDURE — 87624 HPV HI-RISK TYP POOLED RSLT: CPT | Performed by: FAMILY MEDICINE

## 2022-02-11 PROCEDURE — G0123 SCREEN CERV/VAG THIN LAYER: HCPCS | Performed by: FAMILY MEDICINE

## 2022-02-11 RX ORDER — CETIRIZINE HYDROCHLORIDE 5 MG/1
TABLET ORAL
COMMUNITY

## 2022-02-11 ASSESSMENT — MIFFLIN-ST. JEOR: SCORE: 1398.75

## 2022-02-11 NOTE — ASSESSMENT & PLAN NOTE
Covid vaccine - done and boosted.   Flu shot done   Zoster discussed.   Pap: will do today  Mammo: normal 2/10/2022  Colonoscopy: recommended  Std testing desired:  offered  Osteoporosis prevention discussed.  vitamin d levels ordered. Recommend daily calcium and vitamin d intake to keep good bone health. Recommend weight bearing exercise, no tobacco, and limit alcohol  dexa - ordered  Recommend sunscreen, exercise, & healthy diet.  Offered cbc, cmp, lipids and asked what other testing she  desires today  I have had an Advance Directives discussion with the patient.   Body mass index is 29.45 kg/m .   mychart offered.

## 2022-02-11 NOTE — ASSESSMENT & PLAN NOTE
The patient does get some atypical angina type pain, and saw Dr. Wilcox 1/17/22 and he did CT coronary angiogram and everything looked normal and no plaques were observed. I have messaged Dr. Wilcox to ensure we have done everything that needs to be done.  As patient wants to make sure before returning to the gym. I will let her know if anything more is needed once he has a chance to respond.

## 2022-02-11 NOTE — PATIENT INSTRUCTIONS
Return in about 1 year (around 2/11/2023) for Routine preventive.     Patient Education     Prevention Guidelines, Women Ages 50 to 64  Screening tests and vaccines are an important part of managing your health. A screening test is done to find possible disorders or diseases in people who don't have any symptoms. The goal is to find a disease early so lifestyle changes can be made and you can be watched more closely to reduce the risk of disease, or to detect it early enough to treat it most effectively. Screening tests are not considered diagnostic, but are used to determine if more testing is needed. Health counseling is essential, too. Below are guidelines for these, for women ages 50 to 64. Keep in mind that screening advice varies among expert groups. Talk with your healthcare provider about which tests are best for you and to make sure you re up to date on what you need.   Screening  Who needs it  How often    Type 2 diabetes or prediabetes  All women beginning at age 45 and women without symptoms at any age who are overweight or obese and have 1 or more additional risk factors for diabetes.  At  least every 3 years    Type 2 diabetes or prediabetes  All women diagnosed with gestational diabetes  Lifelong testing at least every 3 years    Type 2 diabetes All women with prediabetes  Every year   Unhealthy alcohol use  All women in this age group  At routine exams   Blood pressure All women in this age group  Yearly checkup if your blood pressure is normal   Normal blood pressure is less than 120/80 mm Hg   If your blood pressure reading is higher than normal, follow the advice of your healthcare provider    Breast cancer All women at average risk in this age group  Yearly mammogram should be done until age 54. At age 55, you can switch to every other year or choose to continue yearly.   All women should know how their breasts normally look and feel and know the possible benefits and risks of breast cancer  screening with mammograms.    Cervical cancer All women in this age group, except women who have had a complete hysterectomy  Pap test every 3 years or Pap test with human papillomavirus (HPV) test every 5 years    Chlamydia Women who are sexually active and at increased risk for infection  At yearly routine exams    Colorectal cancer All women at average risk in this age group  Multiple tests are available and are used at different times. Possible tests include:     Flexible sigmoidoscopy every 5 years, or    Colonoscopy every 10 years, or    CT colonography (virtual colonoscopy) every 5 years, or    Yearly fecal occult blood test, or    Yearly fecal immunochemical test every year, or    Stool DNA test, every 3 years  If you choose a test other than a colonoscopy and have an abnormal test result, you will need to follow up with a colonoscopy. Screening advice varies among expert groups. Talk with your healthcare provider about which tests are best for you.   Some people should be screened using a different schedule because of their personal or family health history. Talk with your healthcare provider about your health history.    Depression All women in this age group  At routine exams   Gonorrhea Sexually active women at increased risk for infection  At yearly routine exams    Hepatitis C Anyone at increased risk; 1 time for those born between 1945 and 1965  At routine exams   High cholesterol or triglycerides  All women in this age group who are at risk for coronary artery disease  At least every 5 years; talk with your healthcare provider about your risk    HIV All women At least once during your lifetime; yearly if at high risk    Lung cancer Women between the ages of 55 to 74 who are in fairly good health and are at higher risk for lung cancer         Currently smoke or have  quit within past 15 years         30-pack-year smoking history  , Eligibility criteria and age limit (possibly up to age 80) may vary  across major organizations  Yearly lung cancer screening with a low-dose CT scan (LDCT) Talk with your healthcare provider for more information.    Obesity All women in this age group  At yearly routine exams    Osteoporosis Women who are postmenopausal  Talk with your healthcare provider    Syphilis Women at increased risk for infection  At routine exams; talk with your healthcare provider    Tuberculosis Women at increased risk for infection  Talk with your healthcare provider    Vision All women in this age group  Talk with your healthcare provider    Vaccine Who needs it How often   Chickenpox (varicella)  All women in this age group who have no record of this infection or vaccine  2 doses; the second dose should be given at least 4 weeks after the first dose    Hepatitis A Women at increased risk for infection  2 or 3 doses (depending on the vaccine) given at least 6 months apart; talk with your healthcare provider    Hepatitis B Women at increased risk for infection  2 or 3 doses (depending on the vaccine) ; second dose should be given 1 month after the first dose; if a third dose, it should be given at least 2 months after the second dose and at least 4 months after the first dose; talk with your healthcare provider    Haemophilus influenzae Type B (HIB)  Women at increased risk for infection  1 or 3 doses; talk with your healthcare provider    Influenza (flu) All women in this age group  Once a year   Measles, mumps, rubella (MMR)  Women in this age group born in 1957 or later who have no record of these infections or vaccines  1 or 2doses   Meningococcal Women at increased risk for infection  1 or more doses; talk with your healthcare provider    Pneumococcal conjugate vaccine (PCV13) and pneumococcal polysaccharide vaccine (PPSV23)  Women at increased risk for infection  PCV13: 1 dose ages 19 to 64 (protects against 13 types of pneumococcal bacteria)   PPSV23: 1 or 2 doses through age 64(protects against  23 types of pneumococcal bacteria)   Talk with your healthcare provider   Tetanus/diphtheria/pertussis (Td/Tdap) booster All women in this age group  Td every 10 years, or a 1-time dose of Tdap instead of a Td booster after age 18, then Td every 10 years    Recombinant zoster vaccine (RZV)  All women ages 50 and older  If 2 doses; the 2nd dose is given 2 to 6 months after the first. This is given even if you've had shingles before or had a previous zoster live vaccine.    Counseling Who needs it How often   BRCA gene mutation testing for breast and ovarian cancer susceptibility  Women with increased risk for having gene mutation  When your risk is known; talk with your healthcare provider    Breast cancer and chemoprevention  Women at high risk for breast cancer  When your risk is known; talk with your healthcare provider    Diet and exercise Women who are overweight or obese  When diagnosed, and then at routine exams    Sexually transmitted infection prevention  Women at increased risk for infection  At routine exams; talk with your healthcare provider    Use of daily aspirin  Women ages 50 and up who are at high risk for cardiovascular health problems and not at increased risk for bleeding as identified by their healthcare provider  When your risk is known; talk with your healthcare provider    Use of tobacco and the health effects it can cause  All women in this age group  Every exam   [employer] last reviewed this educational content on 6/1/2020 2000-2021 [employer]. All rights reserved. This information is not intended as a substitute for professional medical care. Always follow your healthcare professional's instructions.

## 2022-02-11 NOTE — PROGRESS NOTES
SUBJECTIVE:   CC: Nisa Daniel is an 57 year old woman who presents for preventive health visit.       Patient has been advised of split billing requirements and indicates understanding: Yes  Healthy Habits:     Getting at least 3 servings of Calcium per day:  Yes    Bi-annual eye exam:  NO    Dental care twice a year:  Yes    Sleep apnea or symptoms of sleep apnea:  None    Diet:  Regular (no restrictions)    Duration of exercise:  Other    Taking medications regularly:  Yes    Medication side effects:  Not applicable    PHQ-2 Total Score: 0    Additional concerns today:  No      Today's PHQ-2 Score:   PHQ-2 ( 1999 [employer]) 2/11/2022   Q1: Little interest or pleasure in doing things 0   Q2: Feeling down, depressed or hopeless 0   PHQ-2 Score 0   Q1: Little interest or pleasure in doing things Not at all   Q2: Feeling down, depressed or hopeless Not at all   PHQ-2 Score 0       Abuse: Current or Past (Physical, Sexual or Emotional) - No  Do you feel safe in your environment? Yes    Have you ever done Advance Care Planning? (For example, a Health Directive, POLST, or a discussion with a medical provider or your loved ones about your wishes): Yes, patient states has an Advance Care Planning document and will bring a copy to the clinic.    Social History     Tobacco Use     Smoking status: Never Smoker     Smokeless tobacco: Never Used   Substance Use Topics     Alcohol use: Yes     Comment: a glass of wine 2-3 times a year, at most         Alcohol Use 2/11/2022   Prescreen: >3 drinks/day or >7 drinks/week? No       Reviewed orders with patient.  Reviewed health maintenance and updated orders accordingly - Yes      Breast Cancer Screening:    Breast CA Risk Assessment (FHS-7) 2/11/2022   Do you have a family history of breast, colon, or ovarian cancer? No / Unknown         Mammogram Screening: Recommended mammography every 1-2 years with patient discussion and risk factor consideration  Pertinent mammograms are  "reviewed under the imaging tab.    History of abnormal Pap smear: NO - age 30-65 PAP every 5 years with negative HPV co-testing recommended     Reviewed and updated as needed this visit by clinical staff  Tobacco  Allergies  Meds  Problems  Med Hx  Surg Hx  Fam Hx  Soc Hx         Reviewed and updated as needed this visit by Provider  Tobacco  Allergies  Meds  Problems  Med Hx  Surg Hx  Fam Hx  Soc Hx          OBJECTIVE:   /80 (BP Location: Left arm, Patient Position: Sitting, Cuff Size: Adult Regular)   Pulse 85   Temp 97.7  F (36.5  C) (Oral)   Resp 12   Ht 1.651 m (5' 5\")   Wt 80.3 kg (177 lb)   SpO2 96%   BMI 29.45 kg/m    Physical Exam  Constitutional:       Appearance: Normal appearance.   HENT:      Head: Normocephalic and atraumatic.      Right Ear: Tympanic membrane, ear canal and external ear normal.      Left Ear: Tympanic membrane, ear canal and external ear normal.      Nose: Nose normal.      Mouth/Throat:      Mouth: Mucous membranes are moist.      Pharynx: Oropharynx is clear.   Eyes:      Extraocular Movements: Extraocular movements intact.      Conjunctiva/sclera: Conjunctivae normal.      Pupils: Pupils are equal, round, and reactive to light.   Cardiovascular:      Rate and Rhythm: Normal rate and regular rhythm.      Heart sounds: Normal heart sounds.   Pulmonary:      Effort: Pulmonary effort is normal.      Breath sounds: Normal breath sounds.   Chest:   Breasts: Breasts are symmetrical.      Right: Normal. No swelling, bleeding, inverted nipple, mass, nipple discharge, skin change or tenderness.      Left: Normal. No swelling, bleeding, inverted nipple, mass, nipple discharge, skin change or tenderness.       Abdominal:      General: Bowel sounds are normal.      Palpations: Abdomen is soft.   Genitourinary:     General: Normal vulva.      Exam position: Lithotomy position.      Vagina: Normal.      Cervix: Normal.      Uterus: Normal.       Adnexa: Right adnexa " normal and left adnexa normal.      Rectum: Normal.   Musculoskeletal:         General: Normal range of motion.      Cervical back: Normal range of motion and neck supple.   Skin:     General: Skin is warm and dry.      Capillary Refill: Capillary refill takes less than 2 seconds.   Neurological:      General: No focal deficit present.      Mental Status: She is alert and oriented to person, place, and time.   Psychiatric:         Mood and Affect: Mood normal.         Behavior: Behavior normal.         Thought Content: Thought content normal.         Judgment: Judgment normal.         ASSESSMENT/PLAN:     Problem List Items Addressed This Visit        Other    Family history of coronary arteriosclerosis     The patient does get some atypical angina type pain, and saw Dr. Wilcox 1/17/22 and he did CT coronary angiogram and everything looked normal and no plaques were observed. I have messaged Dr. Wilcox to ensure we have done everything that needs to be done.  As patient wants to make sure before returning to the gym. I will let her know if anything more is needed once he has a chance to respond.          Encounter for preventive care     Covid vaccine - done and boosted.   Flu shot done   Zoster discussed.   Pap: will do today  Mammo: normal 2/10/2022  Colonoscopy: recommended  Std testing desired:  offered  Osteoporosis prevention discussed.  vitamin d levels ordered. Recommend daily calcium and vitamin d intake to keep good bone health. Recommend weight bearing exercise, no tobacco, and limit alcohol  dexa - ordered  Recommend sunscreen, exercise, & healthy diet.  Offered cbc, cmp, lipids and asked what other testing she  desires today  I have had an Advance Directives discussion with the patient.   Body mass index is 29.45 kg/m .   mychart offered.             Overweight with body mass index (BMI) of 29 to 29.9 in adult     stable           Other Visit Diagnoses     Screening for malignant neoplasm of cervix    -   "Primary    Relevant Orders    Pap screen with HPV - recommended age 30 - 65 years    Special screening for malignant neoplasms, colon        Relevant Orders    Adult Gastro Ref - Procedure Only    Screening for osteoporosis                  Patient has been advised of split billing requirements and indicates understanding: Yes    COUNSELING:  Reviewed preventive health counseling, as reflected in patient instructions       Regular exercise       Healthy diet/nutrition       Osteoporosis prevention/bone health       Safe sex practices/STD prevention       Colorectal Cancer Screening       Advance Care Planning    Estimated body mass index is 29.45 kg/m  as calculated from the following:    Height as of this encounter: 1.651 m (5' 5\").    Weight as of this encounter: 80.3 kg (177 lb).    Weight management plan: Discussed healthy diet and exercise guidelines    She reports that she has never smoked. She has never used smokeless tobacco.          Mirella Haile MD  [facility]  "

## 2022-02-11 NOTE — Clinical Note
Nisa Browne Dr. is wanting to double check that we have done everything to clear her for exercise given her atypical angina.  You saw her in consultation 1/17/2022. Can you let me know if we need to do anything else.  A stress test etc.  It would be reassuring to her to know we have done everything that needs to be done.  Appreciate your help!!  Mirella Haile MD

## 2022-02-12 VITALS
HEART RATE: 66 BPM | DIASTOLIC BLOOD PRESSURE: 74 MMHG | SYSTOLIC BLOOD PRESSURE: 120 MMHG | BODY MASS INDEX: 26.23 KG/M2 | HEIGHT: 65 IN | WEIGHT: 157.4 LBS | TEMPERATURE: 98.8 F

## 2022-02-15 LAB
HUMAN PAPILLOMA VIRUS 16 DNA: NEGATIVE
HUMAN PAPILLOMA VIRUS 18 DNA: NEGATIVE
HUMAN PAPILLOMA VIRUS FINAL DIAGNOSIS: NORMAL
HUMAN PAPILLOMA VIRUS OTHER HR: NEGATIVE

## 2022-02-15 NOTE — PROCEDURES
Accession Number:       [id number]  CLINICAL INFORMATION::     None given  LMP::     NONE GIVEN  PREV. PAP::     UNKNOWN  PREV. BX::     NONE GIVEN  SOURCE::     None given  STATEMENT OF ADEQUACY::     Satisfactory for evaluation. Endocervical/transformation zone component absent. Age and/or menstrual status not provided  INTERPRETATION/RESULT::     Negative for intraepithelial lesion or malignancy.  COMMENT::     This Pap test has been evaluated with computer assisted technology.  CYTOTECHNOLOGIST::     BETH, CT(ASCP) CT Screening location: [place] [address]  REVIEW CYTOTECHNOLOGIST::     ALFONSO, CT(ASCP) CT Screening location: [place] [address]  HPV mRNA E6/E7:     Not Detected       This test was performed using the APTIMA HPV Assay ([employer]       This assay detects E6/E7 viral messenger RNA (mRNA) from 14       high-risk HPV types (16,18,31,33,35,39,45,51,52,56,58,59,66,68).
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
## 2022-02-21 LAB
BKR LAB AP GYN ADEQUACY: NORMAL
BKR LAB AP GYN INTERPRETATION: NORMAL
BKR LAB AP HPV REFLEX: NORMAL
BKR LAB AP PREVIOUS ABNORMAL: NORMAL
PATH REPORT.COMMENTS IMP SPEC: NORMAL
PATH REPORT.COMMENTS IMP SPEC: NORMAL
PATH REPORT.RELEVANT HX SPEC: NORMAL

## 2022-03-03 ENCOUNTER — ANCILLARY PROCEDURE (OUTPATIENT)
Dept: BONE DENSITY | Facility: CLINIC | Age: 57
End: 2022-03-03
Attending: FAMILY MEDICINE
Payer: COMMERCIAL

## 2022-03-03 DIAGNOSIS — Z13.820 SCREENING FOR OSTEOPOROSIS: ICD-10-CM

## 2022-03-03 PROCEDURE — 77080 DXA BONE DENSITY AXIAL: CPT | Mod: TC | Performed by: RADIOLOGY

## 2022-03-04 PROBLEM — M54.50 LOW BACK PAIN: Status: ACTIVE | Noted: 2022-03-04

## 2022-03-22 ENCOUNTER — VIRTUAL VISIT (OUTPATIENT)
Dept: FAMILY MEDICINE | Facility: CLINIC | Age: 57
End: 2022-03-22
Payer: COMMERCIAL

## 2022-03-22 DIAGNOSIS — M85.80 OSTEOPENIA, UNSPECIFIED LOCATION: ICD-10-CM

## 2022-03-22 DIAGNOSIS — Z13.29 SCREENING FOR ENDOCRINE DISORDER: Primary | ICD-10-CM

## 2022-03-22 PROCEDURE — 99213 OFFICE O/P EST LOW 20 MIN: CPT | Mod: GT | Performed by: FAMILY MEDICINE

## 2022-03-22 RX ORDER — CALCIUM CITRATE/VITAMIN D3 200MG-6.25
TABLET ORAL
COMMUNITY
Start: 2022-02-12

## 2022-03-22 NOTE — ASSESSMENT & PLAN NOTE
3/4/22 dexa shows osteopenia, discussed pathophysiology of this disease, usual disease progression and potential mitigating factors    Noted on dexa 2022  Calcium 600mg orally three times daily with dietary source  Vitamin D - she wants to check level.   Avoid smoking  Avoid excess alcohol  Avoid caffeine   Regular weight bearing exercise  Repeat bone density scan 3 years.

## 2022-03-22 NOTE — PROGRESS NOTES
Nisa is a 57 year old who is being evaluated via a billable video visit.      How would you like to obtain your AVS? MyChart  If the video visit is dropped, the invitation should be resent by: Text to cell phone: [phone]  Will anyone else be joining your video visit? No  Video Start Time: 10:57 AM    Assessment & Plan   Problem List Items Addressed This Visit        Musculoskeletal and Integumentary    Osteopenia     3/4/22 dexa shows osteopenia, discussed pathophysiology of this disease, usual disease progression and potential mitigating factors    Noted on dexa 2022  Calcium 600mg orally three times daily with dietary source  Vitamin D - she wants to check level.   Avoid smoking  Avoid excess alcohol  Avoid caffeine   Regular weight bearing exercise  Repeat bone density scan 3 years.          Relevant Orders    Vitamin D Deficiency      Other Visit Diagnoses     Screening for endocrine disorder    -  Primary    Relevant Orders    Vitamin D Deficiency              Return for annual physical.    Mirella Haile MD  [facility]   Nisa is a 57 year old who presents for the following health issues   Discuss dexa  Never got a call to schedule colonoscopy.      Objective           Vitals:  No vitals were obtained today due to virtual visit.    Physical Exam   GENERAL: Healthy, alert and no distress  EYES: Eyes grossly normal to inspection.  No discharge or erythema, or obvious scleral/conjunctival abnormalities.  RESP: No audible wheeze, cough, or visible cyanosis.  No visible retractions or increased work of breathing.    SKIN: Visible skin clear. No significant rash, abnormal pigmentation or lesions.  NEURO: Cranial nerves grossly intact.  Mentation and speech appropriate for age.  PSYCH: Mentation appears normal, affect normal/bright, judgement and insight intact, normal speech and appearance well-groomed.                Video-Visit Details    Type of service:  Video  Visit    Video End Time: 11:10 AM     Originating Location (pt. Location): Home    Distant Location (provider location):  [facility]     Platform used for Video Visit: Josephine    Answers for HPI/ROS submitted by the patient on 3/21/2022  How many servings of fruits and vegetables do you eat daily?: 2-3  On average, how many sweetened beverages do you drink each day (Examples: soda, juice, sweet tea, etc.  Do NOT count diet or artificially sweetened beverages)?: 1  How many minutes a day do you exercise enough to make your heart beat faster?: 30 to 60  How many days a week do you exercise enough to make your heart beat faster?: 4  How many days per week do you miss taking your medication?: 0  What is the reason for your visit today?: Follow up dexa scan

## 2022-12-26 ENCOUNTER — HEALTH MAINTENANCE LETTER (OUTPATIENT)
Age: 57
End: 2022-12-26

## 2023-01-30 ENCOUNTER — TRANSFERRED RECORDS (OUTPATIENT)
Dept: HEALTH INFORMATION MANAGEMENT | Facility: CLINIC | Age: 58
End: 2023-01-30

## 2023-02-01 ENCOUNTER — TRANSFERRED RECORDS (OUTPATIENT)
Dept: HEALTH INFORMATION MANAGEMENT | Facility: CLINIC | Age: 58
End: 2023-02-01

## 2023-02-15 ENCOUNTER — TRANSFERRED RECORDS (OUTPATIENT)
Dept: HEALTH INFORMATION MANAGEMENT | Facility: CLINIC | Age: 58
End: 2023-02-15

## 2023-03-15 ENCOUNTER — TRANSFERRED RECORDS (OUTPATIENT)
Dept: HEALTH INFORMATION MANAGEMENT | Facility: CLINIC | Age: 58
End: 2023-03-15

## 2023-04-22 ENCOUNTER — HEALTH MAINTENANCE LETTER (OUTPATIENT)
Age: 58
End: 2023-04-22

## 2024-01-11 ENCOUNTER — PATIENT OUTREACH (OUTPATIENT)
Dept: CARE COORDINATION | Facility: CLINIC | Age: 59
End: 2024-01-11
Payer: COMMERCIAL

## 2024-04-14 ENCOUNTER — HEALTH MAINTENANCE LETTER (OUTPATIENT)
Age: 59
End: 2024-04-14

## 2024-06-23 ENCOUNTER — HEALTH MAINTENANCE LETTER (OUTPATIENT)
Age: 59
End: 2024-06-23